# Patient Record
Sex: MALE | Race: WHITE | NOT HISPANIC OR LATINO | Employment: FULL TIME | URBAN - METROPOLITAN AREA
[De-identification: names, ages, dates, MRNs, and addresses within clinical notes are randomized per-mention and may not be internally consistent; named-entity substitution may affect disease eponyms.]

---

## 2017-11-06 ENCOUNTER — OFFICE VISIT (OUTPATIENT)
Dept: ORTHOPEDIC SURGERY | Facility: CLINIC | Age: 42
End: 2017-11-06

## 2017-11-06 VITALS — WEIGHT: 185 LBS | HEIGHT: 67 IN | BODY MASS INDEX: 29.03 KG/M2 | TEMPERATURE: 97.9 F

## 2017-11-06 DIAGNOSIS — Z72.0 TOBACCO ABUSE: ICD-10-CM

## 2017-11-06 DIAGNOSIS — M79.672 HEEL PAIN, BILATERAL: Primary | ICD-10-CM

## 2017-11-06 DIAGNOSIS — M72.2 PLANTAR FASCIITIS: ICD-10-CM

## 2017-11-06 DIAGNOSIS — M79.671 HEEL PAIN, BILATERAL: Primary | ICD-10-CM

## 2017-11-06 PROCEDURE — 99204 OFFICE O/P NEW MOD 45 MIN: CPT | Performed by: ORTHOPAEDIC SURGERY

## 2017-11-06 PROCEDURE — 73650 X-RAY EXAM OF HEEL: CPT | Performed by: ORTHOPAEDIC SURGERY

## 2017-11-06 PROCEDURE — 99406 BEHAV CHNG SMOKING 3-10 MIN: CPT | Performed by: ORTHOPAEDIC SURGERY

## 2017-11-06 RX ORDER — BENAZEPRIL HYDROCHLORIDE 20 MG/1
TABLET ORAL
COMMUNITY
Start: 2017-10-08

## 2017-11-06 RX ORDER — FENOFIBRATE 160 MG/1
TABLET ORAL
COMMUNITY
Start: 2017-09-24

## 2017-11-06 RX ORDER — EZETIMIBE 10 MG/1
TABLET ORAL
COMMUNITY
Start: 2017-10-08

## 2017-11-06 RX ORDER — CHLORAL HYDRATE 500 MG
CAPSULE ORAL
COMMUNITY

## 2017-11-06 RX ORDER — ALPRAZOLAM 0.5 MG/1
TABLET ORAL
COMMUNITY
Start: 2017-08-27

## 2017-11-06 RX ORDER — MELOXICAM 15 MG/1
TABLET ORAL
Qty: 30 TABLET | Refills: 1 | Status: SHIPPED | OUTPATIENT
Start: 2017-11-06

## 2017-11-06 RX ORDER — GEMFIBROZIL 600 MG/1
600 TABLET, FILM COATED ORAL
COMMUNITY

## 2017-11-06 NOTE — PROGRESS NOTES
"   New Patient Complaint      Patient: Jerel Rabago  YOB: 1975 42 y.o. male  Medical Record Number: [id number]    Chief Complaints: My heels hurt    History of Present Illness:     Approximate 6 month history of mild to moderate intermittent stabbing pain under his heels left much more so than right.  He has significant pain on start up in the morning and improved dramatically after walking around doesn't really bother him much but he is very active during the day as a .  After he gets home and takes off his boots and sits for a while and stands up again pain recurs.    He's been using the roller as well as occasional use of ibuprofen without help.  He's been doing some Stretching when he first used to work in the morning but nothing else.  His wife works with [employer]    [facility]    Allergies: No Known Allergies    Medications:   No current outpatient prescriptions on file prior to visit.     No current facility-administered medications on file prior to visit.        No past medical history on file.  Past Surgical History:   Procedure Laterality Date   • HERNIA REPAIR       Social History     Occupational History   • Not on file.     Social History Main Topics   • Smoking status: Current Every Day Smoker   • Smokeless tobacco: Not on file   • Alcohol use Yes   • Drug use: No   • Sexual activity: Not on file      Social History     Social History Narrative   • No narrative on file     No family history on file.    Review of Systems: 14 point review of systems performed, positive pertinent findings identified in HPI. All remaining systems negative     Review of Systems      Physical Exam:   Vitals:    11/06/17 0843   Temp: 97.9 °F (36.6 °C)   Weight: 185 lb (83.9 kg)   Height: 67\" (170.2 cm)     Physical Exam   Constitutional: pleasant, well developed   Eyes: sclera non icteric  Hearing : adequate for exam  Cardiovascular: palpable pulses in bilaeral feet, bilateral calves/ thighs NT without sign " of DVT  Respiratoy: breathing unlabored   Neurological: grossly sensate to LT throughout bilateral LEs  Psychiatric: oriented with normal mood and affect.   Lymphatic: No palpable popliteal lymphadenopathy bilateral LEs  Skin: intact throughout bilateral legs/feet  Musculoskeletal:Nonantalgic gait.  Moderate discomfort palpation at the insertion of the plantar fascia left greater than right.  Neutral dorsiflexion a heel cord bilaterally with 5 out of 5 plantarflexion strength.  Neither heel cord demonstrated any palpable deformity and is nontender at the Achilles insertion.    Nontender bilateral retrocalcaneal bursa but no focal pain in the retrocalcaneal area with maximum plantarflexion or resisted flexion of the great toe bilaterally.  Pain with bilateral calcaneal compression  Physical Exam  Ortho Exam    Radiology: 2 views of both heels were ordered today to evaluate pain and reviewed there are no prior x-rays available for comparison there is mild plantar calcaneal spurring left greater than right but no evidence of stress fracture or other osseous lesion.  On the right he does have somewhat of an elongated posterior process the talus    Assessment/Plan: Bilateral plantar fasciitis left greater than right    Plantar fascitis etiology and  treatment protocol discussed with pt. Information sheet provided and reviewed. This with consist of a night splint that was provided and discussed etiology of use on the left. Towel stretch prior to first step in morning. Avoid barefoot ambulation. Gastroc and soleus heel cord stretch 4-5 times throughout the day, for 2-1/2 minutes of each session.  instruction sheet provided and demonstrated for patient. Use of shoes with arch support and ice bottle massage for 15-20 minutes each night. Counseled that improvement may take several months. Additional treatment modalities may include injection, therapy, or immobilization. I do not have anything to offer regarding surgical  treatment at this time. Pt will follow up in 8 weeks.    We'll also have him discontinue ibuprofen and use meloxicam 15 mg grams 1 by mouth daily with GI precautions      Regarding persistent habitual smoking.  I have discussed for at least 4 minutes with the patient the ill effects of continued smoking on pulmonary and cardiovascular health as well as financial burden.  I also discussed at length the effects on peripheral circulation as well as inhibition of soft tissue and bone healing.  I've recommended that they speak with their primary care physician regarding cessation techniques.  He said he has done this and is working on quitting.

## 2017-11-06 NOTE — PROGRESS NOTES
"   New Patient Complaint      Patient: Jerel Rabago  YOB: 1975 42 y.o. male  Medical Record Number: [id number]    Chief Complaints:     History of Present Illness:          HPI    Allergies: No Known Allergies    Medications:   No current outpatient prescriptions on file prior to visit.     No current facility-administered medications on file prior to visit.        No past medical history on file.  Past Surgical History:   Procedure Laterality Date   • HERNIA REPAIR       Social History     Occupational History   • Not on file.     Social History Main Topics   • Smoking status: Current Every Day Smoker   • Smokeless tobacco: Not on file   • Alcohol use Yes   • Drug use: No   • Sexual activity: Not on file      Social History     Social History Narrative   • No narrative on file     No family history on file.    Review of Systems: 14 point review of systems performed, positive pertinent findings identified in HPI. All remaining systems negative     Review of Systems      Physical Exam:   Vitals:    11/06/17 0843   Temp: 97.9 °F (36.6 °C)   Weight: 185 lb (83.9 kg)   Height: 67\" (170.2 cm)     Physical Exam  Ortho Exam    Radiology:    Assessment/Plan:  "

## 2019-09-21 ENCOUNTER — APPOINTMENT (OUTPATIENT)
Dept: GENERAL RADIOLOGY | Facility: HOSPITAL | Age: 44
End: 2019-09-21

## 2019-09-21 ENCOUNTER — HOSPITAL ENCOUNTER (EMERGENCY)
Facility: HOSPITAL | Age: 44
Discharge: HOME | End: 2019-09-21
Attending: EMERGENCY MEDICINE | Admitting: EMERGENCY MEDICINE

## 2019-09-21 VITALS
HEART RATE: 67 BPM | OXYGEN SATURATION: 97 % | SYSTOLIC BLOOD PRESSURE: 146 MMHG | HEIGHT: 68 IN | RESPIRATION RATE: 17 BRPM | WEIGHT: 180 LBS | DIASTOLIC BLOOD PRESSURE: 94 MMHG | TEMPERATURE: 97.3 F | BODY MASS INDEX: 27.28 KG/M2

## 2019-09-21 DIAGNOSIS — S39.012A ACUTE MYOFASCIAL STRAIN OF LUMBAR REGION, INITIAL ENCOUNTER: Primary | ICD-10-CM

## 2019-09-21 PROCEDURE — 72110 X-RAY EXAM L-2 SPINE 4/>VWS: CPT

## 2019-09-21 PROCEDURE — 99283 EMERGENCY DEPT VISIT LOW MDM: CPT

## 2019-09-21 RX ORDER — METHYLPREDNISOLONE 4 MG/1
TABLET ORAL
Qty: 1 EACH | Refills: 0 | Status: SHIPPED | OUTPATIENT
Start: 2019-09-21

## 2019-09-21 RX ORDER — TRAMADOL HYDROCHLORIDE 50 MG/1
50 TABLET ORAL EVERY 6 HOURS PRN
Qty: 15 TABLET | Refills: 0 | Status: SHIPPED | OUTPATIENT
Start: 2019-09-21

## 2019-09-21 RX ORDER — TRAMADOL HYDROCHLORIDE 50 MG/1
50 TABLET ORAL ONCE
Status: COMPLETED | OUTPATIENT
Start: 2019-09-21 | End: 2019-09-21

## 2019-09-21 RX ADMIN — TRAMADOL HYDROCHLORIDE 50 MG: 50 TABLET, FILM COATED ORAL at 09:53

## 2019-09-21 NOTE — ED TRIAGE NOTES
Lower back pain S/P injury while lifting at work Monday. Seen at [facility] Wednesday but still having back pain

## 2019-09-21 NOTE — ED PROVIDER NOTES
EMERGENCY DEPARTMENT ENCOUNTER    CHIEF COMPLAINT  Chief Complaint: Back pain  History given by: Patient  History limited by: NA  Room Number:   PMD: Jesu Medeiros MD      HPI:  Pt is a 44 y.o. 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 Pain radiates down posterior right leg. Patient seen and evaluated at [facility] 3 days ago and states he was prescribed Meloxicam. He reports temporarily relief with muscle relaxer but states pain has yet to fully go away. Denies focal numbness or weakness, including saddle anesthesia. Denies bowel/bladder incontinence.     Duration/Onset/Timin days, continuous  Location: low lumbar back  Radiation: down posterior right leg  Quality: 'pain'  Intensity/Severity: mild  Associated Symptoms: none specified  Aggravating or Alleviating Factors: temporary relief with meloxicam   Previous Episodes: none      PAST MEDICAL HISTORY  Active Ambulatory Problems     Diagnosis Date Noted   • Heel pain, bilateral 2017   • Plantar fasciitis 2017   • Tobacco abuse 2017     Resolved Ambulatory Problems     Diagnosis Date Noted   • No Resolved Ambulatory Problems     Past Medical History:   Diagnosis Date   • Hyperlipidemia    • Hypertension        PAST SURGICAL HISTORY  Past Surgical History:   Procedure Laterality Date   • HERNIA REPAIR         FAMILY HISTORY  History reviewed. No pertinent family history.    SOCIAL HISTORY  Social History     Socioeconomic History   • Marital status:      Spouse name: Not on file   • Number of children: Not on file   • Years of education: Not on file   • Highest education level: Not on file   Tobacco Use   • Smoking status: Current Every Day Smoker     Types: Cigarettes   Substance and Sexual Activity   • Alcohol use: Yes   • Drug use: No       ALLERGIES  Patient has no known allergies.    REVIEW OF SYSTEMS  Review of Systems   Constitutional: Negative for  fever.   Respiratory: Negative for shortness of breath.    Cardiovascular: Negative for chest pain.   Genitourinary: Negative for difficulty urinating.   Musculoskeletal: Positive for back pain.   Neurological: Negative for weakness and numbness.       PHYSICAL EXAM  ED Triage Vitals   Temp Heart Rate Resp BP SpO2   09/21/19 0849 09/21/19 0849 09/21/19 0849 09/21/19 0901 09/21/19 0849   97.3 °F (36.3 °C) 65 17 146/94 97 %      Temp src Heart Rate Source Patient Position BP Location FiO2 (%)   -- -- -- -- --                Physical Exam   Constitutional: He is oriented to person, place, and time and well-developed, well-nourished, and in no distress. No distress.   HENT:   Head: Normocephalic and atraumatic.   Cardiovascular: Normal rate, regular rhythm and normal heart sounds.   Pulmonary/Chest: Effort normal and breath sounds normal. No respiratory distress. He has no wheezes. He has no rales.   Abdominal: Soft. Bowel sounds are normal. He exhibits no distension. There is no tenderness.   Musculoskeletal: He exhibits tenderness.   T and L spine NT. Mild tenderness of right gluteus medius.   Neurological: He is alert and oriented to person, place, and time. He has normal sensation and normal strength. No sensory deficit. He has an abnormal Straight Leg Raise Test (left leg at 120 degrees).   Reflex Scores:       Patellar reflexes are 2+ on the right side and 2+ on the left side.  Skin: No rash noted.   Nursing note and vitals reviewed.      RADIOLOGY  XR Spine Lumbar 4+ View   Final Result   Minor endplate spurring lower lumbar spine. Mild facet   arthritis. Straightening of the lumbar spine. No evidence for fracture   or malalignment.       This report was finalized on 9/21/2019 3:01 PM by Dr. Marshall Robbins M.D.               I ordered the above noted radiological studies. Interpreted by radiologist. Reviewed by me in PACS.       PROCEDURES  Procedures      PROGRESS AND CONSULTS     0915: XR L spine ordered.  Tramadol 50mg ordered for pain management.     1000: L spine XR shows straightening of the lordotic curve and mild arthritis.     1007: Patient rechecked. Reviewed results of XR. Discussed plan for discharge home with prescription for steroids and tramadol. Instructed patient to continue taking muscle relaxant prescribed by Dave Hawkins. He voiced understanding and is agreeable to plan.     MEDICAL DECISION MAKING  Results were reviewed/discussed with the patient and they were also made aware of online access. Pt also made aware that some labs, such as cultures, will not be resulted during ER visit and follow up with PMD is necessary.     MDM  Number of Diagnoses or Management Options     Amount and/or Complexity of Data Reviewed  Tests in the radiology section of CPT®: ordered and reviewed           DIAGNOSIS  Final diagnoses:   Acute myofascial strain of lumbar region, initial encounter       DISPOSITION  DISCHARGE    Patient discharged in stable condition.    Reviewed implications of results, diagnosis, meds, responsibility to follow up, warning signs and symptoms of possible worsening, potential complications and reasons to return to ER.    Patient/Family voiced understanding of above instructions.    Discussed plan for discharge, as there is no emergent indication for admission. Patient referred to primary care provider for BP management due to today's BP. Pt/family is agreeable and understands need for follow up and repeat testing.  Pt is aware that discharge does not mean that nothing is wrong but it indicates no emergency is present that requires admission and they must continue care with follow-up as given below or physician of their choice.     FOLLOW-UP  Jesu Medeiros MD  [address] [facility] [address] [phone]    Schedule an appointment as soon as possible for a visit            Medication List      New Prescriptions    methylPREDNISolone 4 MG tablet  Commonly known as:  MEDROL  (BENI)  Take as directed on package instructions.     traMADol 50 MG tablet  Commonly known as:  ULTRAM  Take 1 tablet by mouth Every 6 (Six) Hours As Needed for Moderate Pain .            Latest Documented Vital Signs:  As of 4:24 PM  BP- 146/94 HR- 67 Temp- 97.3 °F (36.3 °C) O2 sat- 97%    --  Documentation assistance provided by dl Miller for Dr. Alfredo Silva MD.  Information recorded by the scribe was done at my direction and has been verified and validated by me.             Renee Miller  09/21/19 1008       Alfredo Silva MD  09/21/19 3259

## 2019-09-21 NOTE — DISCHARGE INSTRUCTIONS
Ice your lower back and take medications as directed.  Continue using your Flexeril.  Do follow-up with Dr. Medeiros.  You will be sore for another 5 to 7 days.  This is normal.  It should slowly improve over time.  Please return to the emergency department if you develop fever, weakness or numbness.  Do not lift anything greater than 10 pounds for the next week.

## 2019-11-29 ENCOUNTER — HOSPITAL ENCOUNTER (EMERGENCY)
Facility: HOSPITAL | Age: 44
Discharge: HOME | End: 2019-11-29
Attending: EMERGENCY MEDICINE | Admitting: EMERGENCY MEDICINE

## 2019-11-29 VITALS
SYSTOLIC BLOOD PRESSURE: 147 MMHG | RESPIRATION RATE: 16 BRPM | WEIGHT: 185 LBS | DIASTOLIC BLOOD PRESSURE: 93 MMHG | HEART RATE: 87 BPM | TEMPERATURE: 97.8 F | HEIGHT: 67 IN | BODY MASS INDEX: 29.03 KG/M2 | OXYGEN SATURATION: 98 %

## 2019-11-29 DIAGNOSIS — B02.9 HERPES ZOSTER WITHOUT COMPLICATION: Primary | ICD-10-CM

## 2019-11-29 PROCEDURE — 99282 EMERGENCY DEPT VISIT SF MDM: CPT

## 2019-11-29 RX ORDER — METHYLPREDNISOLONE 4 MG/1
TABLET ORAL
Qty: 21 EACH | Refills: 0 | Status: SHIPPED | OUTPATIENT
Start: 2019-11-29

## 2019-11-29 RX ORDER — VALACYCLOVIR HYDROCHLORIDE 1 G/1
1000 TABLET, FILM COATED ORAL 3 TIMES DAILY
Qty: 21 TABLET | Refills: 0 | Status: SHIPPED | OUTPATIENT
Start: 2019-11-29

## 2019-11-29 RX ORDER — BUPIVACAINE HYDROCHLORIDE 5 MG/ML
10 INJECTION, SOLUTION EPIDURAL; INTRACAUDAL ONCE
Status: DISCONTINUED | OUTPATIENT
Start: 2019-11-29 | End: 2019-11-29 | Stop reason: HOSPADM